# Patient Record
Sex: MALE | Race: WHITE | NOT HISPANIC OR LATINO | Employment: FULL TIME | URBAN - NONMETROPOLITAN AREA
[De-identification: names, ages, dates, MRNs, and addresses within clinical notes are randomized per-mention and may not be internally consistent; named-entity substitution may affect disease eponyms.]

---

## 2019-03-28 ENCOUNTER — OUTSIDE FACILITY SERVICE (OUTPATIENT)
Dept: CARDIOLOGY | Facility: CLINIC | Age: 54
End: 2019-03-28

## 2019-03-28 PROCEDURE — 93010 ELECTROCARDIOGRAM REPORT: CPT | Performed by: INTERNAL MEDICINE

## 2023-06-05 ENCOUNTER — TELEPHONE (OUTPATIENT)
Dept: ENT CLINIC | Age: 58
End: 2023-06-05

## 2023-06-05 NOTE — TELEPHONE ENCOUNTER
Linnette Dutta from [address] [place] [address] in [place] requesting referral to be changed to stat due to recent ct of the neck imaging that showed left carotid gland abscess vs brachial cleft cyst benign. Provider would like patient seen as soon as possible. Please return call to Linnette Dutta at [phone].     Thank you,

## 2023-06-07 ENCOUNTER — OFFICE VISIT (OUTPATIENT)
Dept: ENT CLINIC | Age: 58
End: 2023-06-07
Payer: COMMERCIAL

## 2023-06-07 VITALS
WEIGHT: 170 LBS | BODY MASS INDEX: 23.03 KG/M2 | SYSTOLIC BLOOD PRESSURE: 128 MMHG | DIASTOLIC BLOOD PRESSURE: 76 MMHG | HEIGHT: 72 IN

## 2023-06-07 DIAGNOSIS — K11.3 ABSCESS OF PAROTID GLAND: Primary | ICD-10-CM

## 2023-06-07 DIAGNOSIS — L02.11 NECK ABSCESS: ICD-10-CM

## 2023-06-07 PROCEDURE — 99203 OFFICE O/P NEW LOW 30 MIN: CPT | Performed by: PHYSICIAN ASSISTANT

## 2023-06-07 PROCEDURE — 10061 I&D ABSCESS COMP/MULTIPLE: CPT | Performed by: PHYSICIAN ASSISTANT

## 2023-06-07 RX ORDER — METRONIDAZOLE 500 MG/1
TABLET ORAL EVERY 8 HOURS
COMMUNITY

## 2023-06-07 RX ORDER — KETOROLAC TROMETHAMINE 10 MG/1
TABLET, FILM COATED ORAL
COMMUNITY

## 2023-06-07 RX ORDER — CLINDAMYCIN HYDROCHLORIDE 300 MG/1
300 CAPSULE ORAL 3 TIMES DAILY
Qty: 42 CAPSULE | Refills: 0 | Status: SHIPPED | OUTPATIENT
Start: 2023-06-07 | End: 2023-06-21

## 2023-06-07 RX ORDER — AMOXICILLIN AND CLAVULANATE POTASSIUM 875; 125 MG/1; MG/1
TABLET, FILM COATED ORAL
COMMUNITY

## 2023-06-07 RX ORDER — ENALAPRIL MALEATE 20 MG/1
TABLET ORAL
COMMUNITY

## 2023-06-07 ASSESSMENT — ENCOUNTER SYMPTOMS
TROUBLE SWALLOWING: 0
RHINORRHEA: 0
VOICE CHANGE: 0
SINUS PAIN: 0
FACIAL SWELLING: 0
SINUS PRESSURE: 0
EYE PAIN: 0
PHOTOPHOBIA: 0
SORE THROAT: 0

## 2023-06-07 NOTE — PROGRESS NOTES
History     Tobacco Use    Smoking status: Former     Types: Cigarettes    Smokeless tobacco: Not on file   Substance Use Topics    Alcohol use: Yes           REVIEW OF SYSTEMS:  all other systems reviewed and are negative  Review of Systems   Constitutional:  Negative for chills and fever. HENT:  Negative for congestion, dental problem, ear discharge, ear pain, facial swelling, hearing loss, postnasal drip, rhinorrhea, sinus pressure, sinus pain, sore throat, tinnitus, trouble swallowing and voice change. Eyes:  Negative for photophobia and pain. Neurological:  Negative for dizziness and headaches. Comments:     PHYSICAL EXAM:    /76   Ht 6' (1.829 m)   Wt 170 lb (77.1 kg)   BMI 23.06 kg/m²   Body mass index is 23.06 kg/m². General Appearance: well developed  and well nourished  Head/ Face: normocephalic and atraumatic  Vocal Quality: good/ normal  Ears: Right Ear: External: external ears normal Otoscopy Ear Canal: canal clear Otoscopy TM: TM's normal and TM's mobile Left Ear: External: external ears normal Otoscopy Ear Canal: canal clear Otoscopy TM: TM's normal and TM's mobile  Hearing: grossly intact  Nose: nares normal and septum midline  Neck: + for a bar bell abscess with the right abscess noted with spontaneous rupture with purulent drainage. The left abscess was noted to be fluctuant and very superficial.  This was noted be exquisitely tender. Patient is noted to have peripheral cellulitis around this involving the right lateral neck. No other abscesses was appreciated. Thyroid: normal  Oral exam demonstrated no abnormalities to the posterior pharynx with no drainage noted from the salivary duct sublingually. Assessment & Plan:    Problem List Items Addressed This Visit       Abscess of parotid gland - Primary     Right parotid gland abscess-successfully drained under local anesthesia today - culture obtained  Plan: I had Dr. Susette Bosworth to look at the abscess prior to I&D.   We

## 2023-06-08 NOTE — ASSESSMENT & PLAN NOTE
Right parotid gland abscess-successfully drained under local anesthesia today - culture obtained  Plan: I had Dr. Thompson Roman to look at the abscess prior to I&D. We will place the patient on clindamycin for 14 days and continue warm compresses to this area. Patient was instructed to call if this worsens or if they have questions. He is to follow-up in the clinic in 1 week.

## 2023-06-09 ENCOUNTER — TELEPHONE (OUTPATIENT)
Dept: ENT CLINIC | Age: 58
End: 2023-06-09

## 2023-06-09 RX ORDER — LEVOFLOXACIN 750 MG/1
750 TABLET ORAL DAILY
Qty: 10 TABLET | Refills: 0 | Status: SHIPPED | OUTPATIENT
Start: 2023-06-09 | End: 2023-06-19

## 2023-06-09 NOTE — TELEPHONE ENCOUNTER
I called the culture results to the patient. Surprisingly the patient was noted to have a positive culture for moderate growth of E. coli. Patient currently is only taking clindamycin. I added Levaquin to his regiment for dual coverage. Patient is to follow-up in the clinic in 1 week. Overall the patient reports that he is doing much better after I&D.       Electronically signed by Marline Purcell PA-C on 6/9/23 at 2:30 PM CDT

## 2023-06-11 LAB
BACTERIA SPEC ANAEROBE CULT: ABNORMAL
BACTERIA SPEC ANAEROBE+AEROBE CULT: ABNORMAL
BACTERIA SPEC ANAEROBE+AEROBE CULT: ABNORMAL
GRAM STN SPEC: ABNORMAL
ORGANISM: ABNORMAL

## 2023-07-10 ENCOUNTER — OFFICE VISIT (OUTPATIENT)
Dept: ENT CLINIC | Age: 58
End: 2023-07-10
Payer: COMMERCIAL

## 2023-07-10 VITALS
DIASTOLIC BLOOD PRESSURE: 78 MMHG | BODY MASS INDEX: 23.3 KG/M2 | WEIGHT: 172 LBS | SYSTOLIC BLOOD PRESSURE: 136 MMHG | HEIGHT: 72 IN

## 2023-07-10 DIAGNOSIS — K11.20 PAROTIDITIS: ICD-10-CM

## 2023-07-10 DIAGNOSIS — K11.3 ABSCESS OF PAROTID GLAND: Primary | ICD-10-CM

## 2023-07-10 PROCEDURE — 99213 OFFICE O/P EST LOW 20 MIN: CPT | Performed by: PHYSICIAN ASSISTANT

## 2023-07-10 NOTE — PROGRESS NOTES
So Laura is a pleasant 52-year-old  male that presents for a 1 month follow-up after treatment for a right parotid abscess. Patient reports that the wound has completely closed and he is able to shave without any issues. He reports overall he feels very fatigue with no energy. He also reports that his diarrhea seems to be improving with solid stools being produced. Of note, patient admits to having an elevated PSA that is now 14 and is currently undergoing a MRI of the prostate to rule out a possible prostate cancer. He reports that he has had a previous biopsy that was negative, performed at [facility].      Physical examination revealed the patient to have some residual cellulitis from the right mandibular parotid gland with no tenderness noted. The open wounds have completely closed. There is no lymphadenopathy to exam of the neck. No thyromegaly was noted. Examination of the mouth demonstrated no dilatation of the salivary ducts. The posterior pharynx appeared to be normal with no evidence of thrush to the surface of the tongue. Impression: Clinically improving right parotiditis status post I&D of right parotid abscess    Plan: I have recommended to have the patient off work for another 2 weeks due to the fatigue from his infection that seems to be improving. I will have him follow-up with me in 1 month for reevaluation. Ultimately I would like to get a repeat CAT scan in 3 months from the time of I&D to rule out any Warthin's tumor or pleomorphic adenoma. Also the CT would help with defining the anatomy compared to his initial CT. Patient was advised to call if he has recurring symptoms or has any questions.       Electronically signed by Beatris Hung PA-C on 7/10/23 at 4:46 PM CDT

## 2023-07-26 ENCOUNTER — HOSPITAL ENCOUNTER (OUTPATIENT)
Dept: MRI IMAGING | Facility: HOSPITAL | Age: 58
Discharge: HOME | End: 2023-07-26
Admitting: UROLOGY
Payer: COMMERCIAL

## 2023-07-26 DIAGNOSIS — R97.20 ELEVATED PROSTATE SPECIFIC ANTIGEN (PSA): ICD-10-CM

## 2023-07-26 LAB — CREAT BLDA-MCNC: 0.8 MG/DL (ref 0.6–1.3)

## 2023-07-26 PROCEDURE — 72197 MRI PELVIS W/O & W/DYE: CPT

## 2023-07-26 PROCEDURE — 82565 ASSAY OF CREATININE: CPT

## 2023-07-26 PROCEDURE — 0 GADOBENATE DIMEGLUMINE 529 MG/ML SOLUTION: Performed by: UROLOGY

## 2023-07-26 PROCEDURE — A9577 INJ MULTIHANCE: HCPCS | Performed by: UROLOGY

## 2023-07-26 RX ADMIN — GADOBENATE DIMEGLUMINE 20 ML: 529 INJECTION, SOLUTION INTRAVENOUS at 13:56

## 2023-08-10 ENCOUNTER — OFFICE VISIT (OUTPATIENT)
Dept: ENT CLINIC | Age: 58
End: 2023-08-10
Payer: COMMERCIAL

## 2023-08-10 VITALS
BODY MASS INDEX: 23.03 KG/M2 | DIASTOLIC BLOOD PRESSURE: 78 MMHG | HEIGHT: 72 IN | WEIGHT: 170 LBS | SYSTOLIC BLOOD PRESSURE: 132 MMHG

## 2023-08-10 DIAGNOSIS — K11.3 PAROTID ABSCESS: Primary | ICD-10-CM

## 2023-08-10 PROCEDURE — 99213 OFFICE O/P EST LOW 20 MIN: CPT | Performed by: PHYSICIAN ASSISTANT

## 2023-08-10 NOTE — PROGRESS NOTES
Mr. Yohana Panda is a pleasant 23-year-old  male that is status post 2 months from I&D of a right parotid abscess. Patient reports that he is doing great with no pain and reports he can barely feel the residual area that was inflamed. He continues to deny any drainage from the area or any erythematous changes. Physical examination revealed the patient to have some mild residual cellulitis to the right submandibular region. It is much improved. There is no erythema to the skin or any drainage appreciated. No lymphadenopathy was noted to palpation. Examination of mouth demonstrated no dilatation of the salivary ducts. Posterior pharynx was normal.      Impression: Doing well status post I&D of right parotid abscess-2 months ago    Plan: I have recommended a CT scan of the right parotid gland to rule out a Warthin's tumor or a pleomorphic adenoma due to this abnormality. He is agreeable for the follow-up CT, that will be scheduled at [facility].  I will call him the results with further recommendations to follow.       Electronically signed by Josy Hull PA-C on 8/10/23 at 3:33 PM CDT

## 2023-08-15 ENCOUNTER — TELEPHONE (OUTPATIENT)
Dept: ENT CLINIC | Age: 58
End: 2023-08-15

## 2023-08-15 NOTE — TELEPHONE ENCOUNTER
Called pt w/ CT appointment info. He is scheduled with [facility] on Sept. 7 @ 8:00 am.  Arrive 30 min early for registration. Call [phone] to reschedule.

## 2023-11-06 ENCOUNTER — TELEPHONE (OUTPATIENT)
Dept: ENT CLINIC | Age: 58
End: 2023-11-06

## 2023-11-06 NOTE — TELEPHONE ENCOUNTER
I called the results of the CT of the soft tissue neck due to his previous abscess of the parotid gland. Patient was noted with no mass effect or anything suspicious. Incidentally he was found to have partial opacification of the maxillary sinuses however he is asymptomatic. Given the multitude of antibiotics that were given for his abscess, I advised the patient that we will wait until he is symptomatic with the sinus findings. Patient was also told of the thyroid nodule that was partially cystic and measured 1.3 cm in size. Due to the patient going through a work-up for possible prostate cancer, he has requested to not have an ultrasound at this time. Patient was advised to call if he has recurring symptoms or has worsening sinus symptoms.       Electronically signed by Te Rodriguez PA-C on 11/6/23 at 5:11 PM CST

## 2024-11-04 ENCOUNTER — TRANSCRIBE ORDERS (OUTPATIENT)
Dept: ADMINISTRATIVE | Facility: HOSPITAL | Age: 59
End: 2024-11-04
Payer: COMMERCIAL

## 2024-11-05 ENCOUNTER — TRANSCRIBE ORDERS (OUTPATIENT)
Dept: ADMINISTRATIVE | Facility: HOSPITAL | Age: 59
End: 2024-11-05
Payer: COMMERCIAL

## 2024-11-05 DIAGNOSIS — C61 PROSTATE CANCER: Primary | ICD-10-CM

## 2024-12-16 ENCOUNTER — HOSPITAL ENCOUNTER (OUTPATIENT)
Dept: MRI IMAGING | Facility: HOSPITAL | Age: 59
Discharge: HOME | End: 2024-12-16
Admitting: UROLOGY
Payer: COMMERCIAL

## 2024-12-16 DIAGNOSIS — C61 PROSTATE CANCER: ICD-10-CM

## 2024-12-16 PROCEDURE — 25510000001 GADOPICLENOL 0.5 MMOL/ML SOLUTION: Performed by: UROLOGY

## 2024-12-16 PROCEDURE — A9579 GAD-BASE MR CONTRAST NOS,1ML: HCPCS | Performed by: UROLOGY

## 2024-12-16 PROCEDURE — 72197 MRI PELVIS W/O & W/DYE: CPT

## 2024-12-16 RX ADMIN — GADOPICLENOL 7.5 ML: 485.1 INJECTION INTRAVENOUS at 17:00
